# Patient Record
Sex: MALE | Race: BLACK OR AFRICAN AMERICAN | Employment: PART TIME | URBAN - METROPOLITAN AREA
[De-identification: names, ages, dates, MRNs, and addresses within clinical notes are randomized per-mention and may not be internally consistent; named-entity substitution may affect disease eponyms.]

---

## 2017-01-05 ENCOUNTER — NURSE NAVIGATOR (OUTPATIENT)
Dept: FAMILY MEDICINE CLINIC | Facility: CLINIC | Age: 67
End: 2017-01-05

## 2017-01-05 NOTE — PROGRESS NOTES
[facility]    Transitions of Care Follow Up      Clelia Bernheim was hospitalized at [facility] 11/29-11/30/16 for metastatic adenocarcinoma and discharged to home. Goal met. Episode resolved:  No hospitalization or ED visit since discharge on 12/2/16.